# Patient Record
Sex: FEMALE | Race: OTHER
[De-identification: names, ages, dates, MRNs, and addresses within clinical notes are randomized per-mention and may not be internally consistent; named-entity substitution may affect disease eponyms.]

---

## 2020-04-22 ENCOUNTER — HOSPITAL ENCOUNTER (EMERGENCY)
Dept: HOSPITAL 61 - ER | Age: 60
Discharge: HOME | End: 2020-04-22
Payer: COMMERCIAL

## 2020-04-22 VITALS — HEIGHT: 63 IN | WEIGHT: 121.25 LBS | BODY MASS INDEX: 21.48 KG/M2

## 2020-04-22 VITALS — DIASTOLIC BLOOD PRESSURE: 74 MMHG | SYSTOLIC BLOOD PRESSURE: 160 MMHG

## 2020-04-22 DIAGNOSIS — R11.2: ICD-10-CM

## 2020-04-22 DIAGNOSIS — E13.65: ICD-10-CM

## 2020-04-22 DIAGNOSIS — R60.0: ICD-10-CM

## 2020-04-22 DIAGNOSIS — N30.00: Primary | ICD-10-CM

## 2020-04-22 LAB
ALBUMIN SERPL-MCNC: 2.6 G/DL (ref 3.4–5)
ALBUMIN/GLOB SERPL: 0.5 {RATIO} (ref 1–1.7)
ALP SERPL-CCNC: 102 U/L (ref 46–116)
ALT SERPL-CCNC: 25 U/L (ref 14–59)
ANION GAP SERPL CALC-SCNC: 9 MMOL/L (ref 6–14)
APTT PPP: YELLOW S
AST SERPL-CCNC: 21 U/L (ref 15–37)
BACTERIA #/AREA URNS HPF: (no result) /HPF
BASOPHILS # BLD AUTO: 0 X10^3/UL (ref 0–0.2)
BASOPHILS NFR BLD: 0 % (ref 0–3)
BILIRUB SERPL-MCNC: 0.6 MG/DL (ref 0.2–1)
BILIRUB UR QL STRIP: NEGATIVE
BUN SERPL-MCNC: 15 MG/DL (ref 7–20)
BUN/CREAT SERPL: 21 (ref 6–20)
CALCIUM SERPL-MCNC: 9.2 MG/DL (ref 8.5–10.1)
CHLORIDE SERPL-SCNC: 94 MMOL/L (ref 98–107)
CO2 SERPL-SCNC: 31 MMOL/L (ref 21–32)
CREAT SERPL-MCNC: 0.7 MG/DL (ref 0.6–1)
EOSINOPHIL NFR BLD: 0 % (ref 0–3)
EOSINOPHIL NFR BLD: 0 X10^3/UL (ref 0–0.7)
ERYTHROCYTE [DISTWIDTH] IN BLOOD BY AUTOMATED COUNT: 13.4 % (ref 11.5–14.5)
FIBRINOGEN PPP-MCNC: (no result) MG/DL
GFR SERPLBLD BASED ON 1.73 SQ M-ARVRAT: 85.1 ML/MIN
GLOBULIN SER-MCNC: 4.8 G/DL (ref 2.2–3.8)
GLUCOSE SERPL-MCNC: 284 MG/DL (ref 70–99)
HCT VFR BLD CALC: 41.3 % (ref 36–47)
HGB BLD-MCNC: 13.7 G/DL (ref 12–15.5)
LIPASE: 46 U/L (ref 73–393)
LYMPHOCYTES # BLD: 1.7 X10^3/UL (ref 1–4.8)
LYMPHOCYTES NFR BLD AUTO: 16 % (ref 24–48)
MCH RBC QN AUTO: 27 PG (ref 25–35)
MCHC RBC AUTO-ENTMCNC: 33 G/DL (ref 31–37)
MCV RBC AUTO: 81 FL (ref 79–100)
MONO #: 0.9 X10^3/UL (ref 0–1.1)
MONOCYTES NFR BLD: 8 % (ref 0–9)
NEUT #: 8.1 X10^3/UL (ref 1.8–7.7)
NEUTROPHILS NFR BLD AUTO: 75 % (ref 31–73)
NITRITE UR QL STRIP: NEGATIVE
PH UR STRIP: 6.5 [PH]
PLATELET # BLD AUTO: 211 X10^3/UL (ref 140–400)
POTASSIUM SERPL-SCNC: 3.9 MMOL/L (ref 3.5–5.1)
PROT SERPL-MCNC: 7.4 G/DL (ref 6.4–8.2)
PROT UR STRIP-MCNC: 30 MG/DL
RBC # BLD AUTO: 5.07 X10^6/UL (ref 3.5–5.4)
RBC #/AREA URNS HPF: (no result) /HPF (ref 0–2)
SODIUM SERPL-SCNC: 134 MMOL/L (ref 136–145)
SQUAMOUS #/AREA URNS LPF: (no result) /LPF
UROBILINOGEN UR-MCNC: 1 MG/DL
WBC # BLD AUTO: 10.8 X10^3/UL (ref 4–11)
WBC #/AREA URNS HPF: >40 /HPF (ref 0–4)

## 2020-04-22 PROCEDURE — 96375 TX/PRO/DX INJ NEW DRUG ADDON: CPT

## 2020-04-22 PROCEDURE — 36415 COLL VENOUS BLD VENIPUNCTURE: CPT

## 2020-04-22 PROCEDURE — 76775 US EXAM ABDO BACK WALL LIM: CPT

## 2020-04-22 PROCEDURE — 87086 URINE CULTURE/COLONY COUNT: CPT

## 2020-04-22 PROCEDURE — 96361 HYDRATE IV INFUSION ADD-ON: CPT

## 2020-04-22 PROCEDURE — 81025 URINE PREGNANCY TEST: CPT

## 2020-04-22 PROCEDURE — 96374 THER/PROPH/DIAG INJ IV PUSH: CPT

## 2020-04-22 PROCEDURE — 83690 ASSAY OF LIPASE: CPT

## 2020-04-22 PROCEDURE — 80053 COMPREHEN METABOLIC PANEL: CPT

## 2020-04-22 PROCEDURE — 99285 EMERGENCY DEPT VISIT HI MDM: CPT

## 2020-04-22 PROCEDURE — 74176 CT ABD & PELVIS W/O CONTRAST: CPT

## 2020-04-22 PROCEDURE — 81001 URINALYSIS AUTO W/SCOPE: CPT

## 2020-04-22 PROCEDURE — 85025 COMPLETE CBC W/AUTO DIFF WBC: CPT

## 2020-04-22 NOTE — RAD
Study: CT abdomen/pelvis without intravenous contrast

 

Indication: Left flank pain.

 

Comparison: None.

 

Technique: Helical CT imaging performed of the abdomen and pelvis without 

the use of intravenous contrast. Sagittal and coronal reformats were 

obtained. 

 

One or more of the following individualized dose reduction techniques were

utilized for this examination:  

 

1. Automated exposure control

2. Adjustment of the mA and/or kV according to patient size

3. Use of iterative reconstruction technique.

 

Findings:

 

Inherently limited evaluation without intravenous contrast.

 

Chest: Scattered granulomas. Partially visualized mild calcific coronary 

artery disease.

 

Liver: Unremarkable.

 

Gallbladder/Biliary Tree: Within normal limits.

 

Pancreas: Within normal limits.

 

Spleen: Within normal limits.

 

Adrenal Glands: Unremarkable.

 

Kidneys/Ureters/Bladder: Edematous prominence of the left kidney relative 

to the right. Intrarenal stone at the lower pole the left kidney measures 

at 5.5 mm. There is no hydroureteronephrosis and no stone is seen along 

the visualized course of the left ureter nor within the urinary bladder. 

No nephrolithiasis or collecting system dilatation on the right. 

Low-attenuation focus within internal density of approximately 20 

Hounsfield units within the upper cortex of the left kidney, image 68 

series 2, measuring approximately 2.1 x 2.3 x 2.0 cm.

 

Circumferential wall thickening of the urinary bladder.

 

Reproductive Organs: No concerning adnexal abnormality. A rounded 

low-attenuation structure at the upper aspect of the left adnexa on image 

161 series 2, is favored small bowel. 

 

Colon: Constipated state.

 

Appendix: Normal.

 

Small Bowel: Nonobstructed.

 

Stomach: Unremarkable.

 

Vasculature: Mild calcific atherosclerosis. Nonaneurysmal abdominal aorta.

Asymmetric prominence of the left ovarian vein with some fatty stranding 

around this vessel approaching its insertion with the left renal vein such

as seen on image 87 series 2.

 

Lymph Nodes: No pathologically enlarged lymph nodes.

 

Peritoneum and Body Wall: No acute abnormality

 

Bones: Scattered degenerative changes greatest at the L5-S1 level where 

there is bilateral osseous neural foraminal encroachment.

 

Miscellaneous: None.

 

Impression:

 

1.  Edematous prominence of the left kidney relative to the right. An 

intrarenal stone is seen at the lower pole the left kidney but there is no

obstructing stone seen throughout the collecting system to account for 

this appearance. Note is made that the urinary bladder wall is 

circumferentially thickened. The findings could be related to recent 

passage of a stone or could be secondary to pyelonephritis from an 

ascending urinary tract infection. No collecting system dilatation on the 

right.

2.  Low-attenuation lesion within the cortex of the upper left kidney 

measuring up to 2.3 cm. An infected fluid collection is felt unlikely and 

an indeterminate cyst is favored. Given incomplete characterization, 

eventual renal sonography is recommended to further assess.

3.  Asymmetric prominence of the left ovarian vein. Some haziness of the 

surrounding fat as the vessel approaches its insertion with the left renal

vein is favored related to inflammatory changes involving the adjacent 

kidney. Ovarian vein thrombosis is not fully excluded but is considered 

less likely to account for this appearance.

4.  Constipation.

5.  Additional chronic findings as above.

 

Electronically signed by: MAYRA ENRIQUEZ MD (4/22/2020 12:03 PM) 

[id number]

## 2020-04-22 NOTE — PHYS DOC
General Adult


EDM:


Chief Complaint:  FLANK PAIN





HPI:


HPI:





Patient is a 59  year old [place]ian-speaking female with history of diabetes 

mellitus who presents with complaining of left flank pain.  History was taken 

via translating line.  Patient complaining of left flank pain for the last 8 

days with radiation to left side of abdomen with nausea and some episodes of 

vomiting without fever and chills, chest pain, shortness of breath, diarrhea and

constipation, urinary symptoms, history of the same pain.  Patient rated her 

pain as a moderate pain and states she was seen by her primary care physician 

and treated with Bentyl and Zofran without improvement of her pain.





Review of Systems:


Review of Systems:


Constitutional:  Denies fever or chills. []


Eyes:  Denies change in visual acuity. []


HENT:  Denies nasal congestion or sore throat. [] 


Respiratory:  Denies cough or shortness of breath. [] 


Cardiovascular:  Denies chest pain or edema. [] 


GI: Reports abdominal pain, nausea, vomiting, denies bloody stools or diarrhea. 

[] 


:  Denies dysuria. [] 


Musculoskeletal:  Denies back pain or joint pain. [] 


Integument:  Denies rash. [] 


Neurologic:  Denies headache, focal weakness or sensory changes. [] 


Endocrine:  Denies polyuria or polydipsia. [] 


Lymphatic:  Denies swollen glands. [] 


Psychiatric:  Denies depression or anxiety. []





Heart Score:


Risk Factors:


Risk Factors:  DM, Current or recent (<one month) smoker, HTN, HLP, family 

history of CAD, obesity.


Risk Scores:


Score 0 - 3:  2.5% MACE over next 6 weeks - Discharge Home


Score 4 - 6:  20.3% MACE over next 6 weeks - Admit for Clinical Observation


Score 7 - 10:  72.7% MACE over next 6 weeks - Early Invasive Strategies





Physical Exam:


PE:








Constitutional: Well developed, well nourished, mild distress, non-toxic 

appearance. []


HENT: Normocephalic, atraumatic.


Eyes: PERRLA, EOMI, conjunctiva normal, no discharge. [] 


Neck: Normal range of motion, no tenderness, supple, no stridor. [] 


Cardiovascular:Heart rate regular rhythm, no murmur []


Lungs & Thorax:  Bilateral breath sounds clear to auscultation []


Abdomen: Bowel sounds normal, soft, no tenderness, no masses, no pulsatile 

masses. [] 


Skin: Warm, dry, no erythema, no rash. [] 


Back: No tenderness, no CVA tenderness. [] 


Extremities: No tenderness, no cyanosis, no clubbing, ROM intact, no edema. [] 


Neurologic: Alert and oriented X 3, no focal deficits noted. []


Psychologic: Affect normal, judgement normal, mood normal. []





Current Patient Data:


Labs:





                                Laboratory Tests








Test


 20


11:00


 


POC Urine HCG, Qualitative


 Hcg negative


(Negative)











EKG:


EKG:


[]





Radiology/Procedures:


Radiology/Procedures:


                            [facility]


                    [address]  [place], [place] [id number] [phone]


                                        


                                 IMAGING REPORT





                                     Signed





PATIENT: GABRIELA LIMAACCOUNT: [id number]     MRN#: [id number]


: 1960           LOCATION: ER              AGE: 59


SEX: F                    EXAM DT: 20         ACCESSION#: [id number]


STATUS: PRE ER            ORD. PHYSICIAN: ELLIOTT JENSEN MD


REASON: Left flank pain


PROCEDURE: CT ABDOMEN PELVIS WO CONTRAST





Study: CT abdomen/pelvis without intravenous contrast


 


Indication: Left flank pain.


 


Comparison: None.


 


Technique: Helical CT imaging performed of the abdomen and pelvis without 


the use of intravenous contrast. Sagittal and coronal reformats were 


obtained. 


 


One or more of the following individualized dose reduction techniques were


utilized for this examination:  


 


1. Automated exposure control


2. Adjustment of the mA and/or kV according to patient size


3. Use of iterative reconstruction technique.


 


Findings:


 


Inherently limited evaluation without intravenous contrast.


 


Chest: Scattered granulomas. Partially visualized mild calcific coronary 


artery disease.


 


Liver: Unremarkable.


 


Gallbladder/Biliary Tree: Within normal limits.


 


Pancreas: Within normal limits.


 


Spleen: Within normal limits.


 


Adrenal Glands: Unremarkable.


 


Kidneys/Ureters/Bladder: Edematous prominence of the left kidney relative 


to the right. Intrarenal stone at the lower pole the left kidney measures 


at 5.5 mm. There is no hydroureteronephrosis and no stone is seen along 


the visualized course of the left ureter nor within the urinary bladder. 


No nephrolithiasis or collecting system dilatation on the right. 


Low-attenuation focus within internal density of approximately 20 


Hounsfield units within the upper cortex of the left kidney, image 68 


series 2, measuring approximately 2.1 x 2.3 x 2.0 cm.


 


Circumferential wall thickening of the urinary bladder.


 


Reproductive Organs: No concerning adnexal abnormality. A rounded 


low-attenuation structure at the upper aspect of the left adnexa on image 


161 series 2, is favored small bowel. 


 


Colon: Constipated state.


 


Appendix: Normal.


 


Small Bowel: Nonobstructed.


 


Stomach: Unremarkable.


 


Vasculature: Mild calcific atherosclerosis. Nonaneurysmal abdominal aorta.


Asymmetric prominence of the left ovarian vein with some fatty stranding 


around this vessel approaching its insertion with the left renal vein such


as seen on image 87 series 2.


 


Lymph Nodes: No pathologically enlarged lymph nodes.


 


Peritoneum and Body Wall: No acute abnormality


 


Bones: Scattered degenerative changes greatest at the L5-S1 level where 


there is bilateral osseous neural foraminal encroachment.


 


Miscellaneous: None.


 


Impression:


 


1.  Edematous prominence of the left kidney relative to the right. An 


intrarenal stone is seen at the lower pole the left kidney but there is no


obstructing stone seen throughout the collecting system to account for 


this appearance. Note is made that the urinary bladder wall is 


circumferentially thickened. The findings could be related to recent 


passage of a stone or could be secondary to pyelonephritis from an 


ascending urinary tract infection. No collecting system dilatation on the 


right.


2.  Low-attenuation lesion within the cortex of the upper left kidney 


measuring up to 2.3 cm. An infected fluid collection is felt unlikely and 


an indeterminate cyst is favored. Given incomplete characterization, 


eventual renal sonography is recommended to further assess.


3.  Asymmetric prominence of the left ovarian vein. Some haziness of the 


surrounding fat as the vessel approaches its insertion with the left renal


vein is favored related to inflammatory changes involving the adjacent 


kidney. Ovarian vein thrombosis is not fully excluded but is considered 


less likely to account for this appearance.


4.  Constipation.


5.  Additional chronic findings as above.


 


Electronically signed by: MAYRA ENRIQUEZ MD (2020 12:03 PM) 


[id number]














DICTATED and SIGNED BY:     MAYRA ENRIQUEZ MD


DATE:     20 1203


                            [facility]


                    [address] [place] [address]


                                 [phone]


                                        


                                 IMAGING REPORT





                                     Signed





PATIENT: GABRIELA LIMAACCOUNT: [id number]     MRN#: [id number]


: 1960           LOCATION: ER              AGE: 59


SEX: F                    EXAM DT: 20         ACCESSION#: [id number]


STATUS: REG ER            ORD. PHYSICIAN: ELLIOTT JENESN MD


REASON: Abnormal CT/ LT FLANK PAIN X 8DAYS


PROCEDURE: RENAL COMPLETE LEFT





 


Left unilateral renal ultrasound


 


History:  Low density lesion on left kidney on recent CT


 


Sonographic examination of the left kidney and bladder was performed and 


multiple static images were obtained.


 


 


The left  kidney is seen with no hydronephrosis and measures 13 cm in 


length. There is a small nonobstructive stone in the left renal pelvis. 


There is a 2.4 x 1.9 cm simple cyst arising laterally from the upper pole.


 


 


Urinary bladder:


 


The urinary bladder appears normal bilateral ureteral jets.


 


Impression:


 


1. Nonobstructive stone in the left renal pelvis.


2. No hydronephrosis.


3. Simple cyst arising from the upper pole of the left kidney.


 


Electronically signed by: Ngoc Baum III, MD (2020 12:55 PM) 


[id number]














DICTATED and SIGNED BY:     NGOC BAUM III, MD


DATE:     20 1255





Course & Med Decision Making:


Course & Med Decision Making


Pertinent Labs and Imaging studies reviewed. (See chart for details)





Discharge:





I've spoken with the patient and/or caregivers. I've explained the patient's 

condition, diagnosis and treatment plan based on information available to me at 

this time. I've answered the patient's and/or caregivers questions and addressed

 any concerns. The patient and/or caregivers have a good understanding the 

patient's diagnosis, condition and treatment plan as can be expected at this 

point. Vital signs have been stabilized. The patient's condition is stable for d

ischarge from the emergency department.





The patient will pursue further outpatient evaluation with her primary care 

provider or other designated consulting physician as outlined in the discharge 

instructions. Patient and/or caregivers are agreeable to this plan of care and 

follow-up instructions have been explained in detail. The patient and/or 

caregivers have received these instructions in written format and expressed 

understanding of these discharge instructions. The patient and her caregivers 

are aware that if any significant change in condition or worsening of symptoms 

should prompt him to immediately return to this of the closest emergency 

department.  If an emergent department is not readily available I would 

encourage him to call [phone].





Dragon Disclaimer:


Dragon Disclaimer:


This electronic medical record was generated, in whole or in part, using a voice

 recognition dictation system.





Departure


Departure


Impression:  


   Primary Impression:  


   Urinary tract infection


   Qualified Codes:  N30.00 - Acute cystitis without hematuria


   Additional Impressions:  


   Nephrolithiasis


   Flank pain


   Uncontrolled diabetes mellitus


   Qualified Codes:  E13.65 - Other specified diabetes mellitus with 

   hyperglycemia


   Renal cyst


   Nausea and vomiting


   Qualified Codes:  R11.2 - Nausea with vomiting, unspecified


Disposition:   HOME, SELF-CARE (At 1330)


Condition:  IMPROVED


Patient Instructions:  Diet - 1500 Calorie Diabetic, Diet for Kidney Stones, 

Flank Pain, Hyperglycemia, Urinary Tract Infection





Additional Instructions:  


Drink plenty of liquids


Follow-up with your primary care physician in 3-5 days


Return to ER if not getting better





Thank you for visiting [facility]. We appreciate you trusting us 

with your care. If any additional problems come up don't hesitate to return to 

visit us. Please follow up with your primary care provider so they can plan 

additional care if needed and know about the problem that you had. If symptoms 

worsen come back to the Emergency Department. Any concerning symptoms that start

 such as chest pain, shortness of air, weakness or numbness on one side of the 

body, running high fevers or any other concerning symptoms return to the ER.


Scripts


Tramadol Hcl (ULTRAM) 50 Mg Tablet


50 MG PO Q6HRS PRN for PAIN, #14 TAB 0 Refills


   Prov: ELLIOTT JENSEN MD         20 


Ondansetron Hcl (ZOFRAN) 4 Mg Tablet


1 TAB PO PRN Q6-8HRS for nausea, #12 TAB


   Prov: ELLIOTT JENSEN MD         20 


Cephalexin (KEFLEX) 500 Mg Capsule


1 CAP PO Q8HRS, #21 CAP 0 Refills


   Prov: ELLIOTT JENSEN MD         20











ELLIOTT JENSEN MD             2020 11:32

## 2020-04-22 NOTE — RAD
Left unilateral renal ultrasound

 

History:  Low density lesion on left kidney on recent CT

 

Sonographic examination of the left kidney and bladder was performed and 

multiple static images were obtained.

 

 

The left  kidney is seen with no hydronephrosis and measures 13 cm in 

length. There is a small nonobstructive stone in the left renal pelvis. 

There is a 2.4 x 1.9 cm simple cyst arising laterally from the upper pole.

 

 

Urinary bladder:

 

The urinary bladder appears normal bilateral ureteral jets.

 

Impression:

 

1. Nonobstructive stone in the left renal pelvis.

2. No hydronephrosis.

3. Simple cyst arising from the upper pole of the left kidney.

 

Electronically signed by: Maverick Ramirez III, MD (4/22/2020 12:55 PM) 

[id number]